# Patient Record
Sex: FEMALE | Race: WHITE | Employment: UNEMPLOYED | URBAN - METROPOLITAN AREA
[De-identification: names, ages, dates, MRNs, and addresses within clinical notes are randomized per-mention and may not be internally consistent; named-entity substitution may affect disease eponyms.]

---

## 2019-09-23 ENCOUNTER — TELEPHONE (OUTPATIENT)
Dept: ONCOLOGY | Age: 64
End: 2019-09-23

## 2019-09-23 NOTE — TELEPHONE ENCOUNTER
Attempted to reach patient to schedule new patient appointment with Dr. Adarsh Fofana per referral from Dr. Chyna Vega but no answer or voicemail.

## 2019-10-02 ENCOUNTER — OFFICE VISIT (OUTPATIENT)
Dept: ONCOLOGY | Age: 64
End: 2019-10-02

## 2019-10-02 DIAGNOSIS — N83.202 LEFT OVARIAN CYST: Primary | ICD-10-CM

## 2019-10-02 DIAGNOSIS — R93.89 THICKENED ENDOMETRIUM: ICD-10-CM

## 2019-10-02 NOTE — PROGRESS NOTES
[address], [address], [address] [facility]  [address] [facility], [address] [place] [address]  [place], [address]   [phone]  Osvaldo Manriquez DO      Patient ID:  Name:  Bryce Don  MRN:  [id number]  :  1955/63 y.o. Date:  10/2/2019      HISTORY OF PRESENT ILLNESS:  Bryce Don is a 59 y.o.   postmenopausal female referred by Dr. Lin Ding for complex cyst and endometrial polyp. Previous history is positive for myomectomy x 3 and right oophorectomy. Here today for furthur evaluation. Pt reports presenting to PCP regarding sharp pain in left side of pelvis. Had an exam concerning for mass. Was sent to gyn and had an ultrasound. Has occasional left sided abdominal pain. Denies bleeding. Admits to constipation. ,     Labs:  2019 : 6.8      Imaging  TVUS 19   FINDINGS:    UTERUS:     > Size: 4.2 x 1.7 x 3.7 cm.    > Masses: 1 cm diameter hypoechoic intramural mass right side uterus, additional 7 mm diameter hypoechoic intramural mass. > Endometrium: Small fluid within the endometrial cavity with polypoid-appearing echogenic lesion within the endometrial cavity measuring maximally 8 mm in diameter. Maximal thickness endometrial stripe 4 mm. RIGHT ADNEXA:     > Reported right-sided oophorectomy with no abnormal mass right adnexa.    >    LEFT ADNEXA:    > 2.3 x 0.9 x 1.7 cm. Color and spectral Doppler demonstrate normal flow to the left ovary with no evidence of ovarian torsion. Arterial and venous waveforms are normal.    > There is a complex 1.2 cm diameter lesion with rim of hypoechogenicity and central hyperechogenicity. OTHER: None. IMPRESSION  1. Several small intramural uterine fibroids the largest 1 cm. 2. Polypoid echogenicity endometrial cavity, possible endometrial polyp, differential diagnosis including hyperplasia or carcinoma.  Nonspecific small endometrial fluid.    3. 1.2 cm complex left adnexal lesion nonspecific but likely benign. ROS:   As above      There are no active problems to display for this patient. Past Medical History:   Diagnosis Date    Anemia     Back pain     Bronchitis     Esophageal reflux     Fibromyalgia     Headache(784.0)     Renal disorder     Ulcer, gastric, acute     UTI (lower urinary tract infection)       Past Surgical History:   Procedure Laterality Date    HX BACK SURGERY      HX BREAST BIOPSY      HX OTHER SURGICAL      Spleen Removal, Total    HX TUBAL LIGATION        OB History    None       Social History     Tobacco Use    Smoking status: Current Every Day Smoker    Smokeless tobacco: Never Used   Substance Use Topics    Alcohol use: Yes      Family History   Problem Relation Age of Onset    Cancer Mother       Current Outpatient Medications   Medication Sig    dextroamphetamine sulfate (DEXTROAMPHETAMINE, BULK,) Take 20 mg by mouth two (2) times a day.  HYDROcodone-acetaminophen (VICODIN HP)  mg per tablet Take  by mouth.  valACYclovir (VALTREX) 500 mg tablet Take  by mouth two (2) times a day.  morphine (AVINZA) 120 mg SR capsule Take  by mouth daily.  temazepam (RESTORIL) 15 mg capsule Take  by mouth nightly as needed for Sleep.  escitalopram (LEXAPRO) 20 mg tablet Take 20 mg by mouth daily.  FIBER, HERBAL, PO Take  by mouth.  omeprazole (PRILOSEC) 20 mg capsule Take 20 mg by mouth daily. No current facility-administered medications for this visit. Allergies   Allergen Reactions    Ibuprofen Unknown (comments)    Sulfa (Sulfonamide Antibiotics) Hives          OBJECTIVE:    Physical Exam  VITAL SIGNS: There were no vitals taken for this visit. GENERAL GILSON: in no apparent distress and well developed and well nourished   MUSCULOSKEL: no joint tenderness, deformity or swelling   INTEGUMENT:  warm and dry, no rashes or lesions   ABDOMEN . soft, NT, ND, No masses appreciated. Prior pfanensteil and midline   EXTREMITIES: extremities normal, atraumatic, no cyanosis or edema   PELVIC: Vulva and vagina appear normal. Bimanual exam reveals normal uterus and adnexa. RECTAL: deferred   GAEL SURVEY: Cervical, supraclavicular, axillary and inguinal nodes normal.   NEURO: Grossly normal         IMPRESSION/PLAN:  1. Complex left ovarian cyst, thickened endometrium (?polyp)    -reviewed her imaging and blood work   -discussed option of hysteroscopy/D&C and if benign serial ultrasound of ovary vs. Definitive treatment with hysterectomy, LSO with intraop path and staging if malignant.  Discussed higher risk of surgery given prior surgeries   -pt to discuss and let us know   -all of ms. gonzalez's questions/concerns addressed   -    The total time spent was 60 minutes regarding this patients diagnosis of left ovarian cyst, endometrial thickening and >50% of this time was spent counseling and coordinating care    [address] [place] [address]  Gynecologic Oncology  10/2/25292:36 AM

## 2019-10-02 NOTE — PATIENT INSTRUCTIONS
Pelvic Laparoscopy: Before Your Surgery What is pelvic laparoscopy? Pelvic laparoscopy (say \"tny-ea-ZZIY-kwame-pee\") is a type of surgery. It can help a doctor diagnose or treat a problem with your pelvic organs. These include the uterus, intestines, or bladder. This kind of surgery uses very small cuts. These cuts are called incisions. To do this surgery, a doctor puts a lighted tube through incisions in your belly. This tube is called a scope. It lets your doctor see your organs. Then the doctor inflates your belly with gas. The gas makes it easier and safer to see your organs. After the doctor puts special tools through the scope, he or she can see or remove what is needed. Next, the doctor releases most of the gas from your belly and closes your incisions with stitches. These incisions leave scars that fade with time. You will probably be asleep during the surgery. But if you are awake, you may feel some stretching and discomfort in your belly. Either way, you will not feel any pain. After the surgery, you will stay in the hospital for about 1 to 4 hours. You may be able to go back to work the next day. But some people need to rest for a few days to a few weeks before they can go back to work. It depends on the type of surgery you had, the type of work you do, and how you feel. Some people need more surgeries or treatments after this surgery. It depends on what the doctor finds. Follow-up care is a key part of your treatment and safety. Be sure to make and go to all appointments, and call your doctor if you are having problems. It's also a good idea to know your test results and keep a list of the medicines you take. What happens before surgery? 
 Surgery can be stressful. This information will help you understand what you can expect.  And it will help you safely prepare for your surgery. 
 Preparing for surgery 
  · Understand exactly what surgery is planned, along with the risks, benefits, and other options. · Tell your doctors ALL the medicines, vitamins, supplements, and herbal remedies you take. Some of these can increase the risk of bleeding or interact with anesthesia.  
  · If you take blood thinners, such as warfarin (Coumadin), clopidogrel (Plavix), or aspirin, be sure to talk to your doctor. He or she will tell you if you should stop taking these medicines before your surgery. Make sure that you understand exactly what your doctor wants you to do.  
  · Your doctor will tell you which medicines to take or stop before your surgery. You may need to stop taking certain medicines a week or more before surgery. So talk to your doctor as soon as you can.  
  · If you have an advance directive, let your doctor know. It may include a living will and a durable power of  for health care. Bring a copy to the hospital. If you don't have one, you may want to prepare one. It lets your doctor and loved ones know your health care wishes. Doctors advise that everyone prepare these papers before any type of surgery or procedure. What happens on the day of surgery? · Follow the instructions exactly about when to stop eating and drinking. If you don't, your surgery may be canceled. If your doctor told you to take your medicines on the day of surgery, take them with only a sip of water.  
  · Take a bath or shower before you come in for your surgery. Do not apply lotions, perfumes, deodorants, or nail polish.  
  · Do not shave the surgical site yourself.  
  · Take off all jewelry and piercings. And take out contact lenses, if you wear them.  
 At the hospital or surgery center · Bring a picture ID.  
  · The area for surgery is often marked to make sure there are no errors.  
  · You will be kept comfortable and safe by your anesthesia provider. The anesthesia may make you sleep. Or it may just numb the area being worked on.  
  · The surgery will take about 1 hour. Going home · Be sure you have someone to drive you home. Anesthesia and pain medicine make it unsafe for you to drive.  
  · You will be given more specific instructions about recovering from your surgery. They will cover things like diet, wound care, follow-up care, driving, and getting back to your normal routine. When should you call your doctor? · You have questions or concerns.  
  · You don't understand how to prepare for your surgery.  
  · You become ill before the surgery (such as fever, flu, or a cold).  
  · You need to reschedule or have changed your mind about having the surgery. Where can you learn more? Go to http://lalita-james.info/. Enter [phone] [phone] in the search box to learn more about \"Pelvic Laparoscopy: Before Your Surgery. \" Current as of: February 19, 2019 Content Version: 12.2 © 1075-4963 [employer]. Care instructions adapted under license by [employer] (which disclaims liability or warranty for this information). If you have questions about a medical condition or this instruction, always ask your healthcare professional. [address] any warranty or liability for your use of this information.

## 2019-10-02 NOTE — Clinical Note
10/2/19 Patient: Juliana Zambrano YOB: 1955 Date of Visit: 10/2/2019 Dian Montana MD 
VIA Dear Dian Montana MD, Thank you for referring Ms. Monica Null to [facility] for evaluation. My notes for this consultation are attached. If you have questions, please do not hesitate to call me. I look forward to following your patient along with you. Sincerely, Meme Roldan MD

## 2019-12-27 ENCOUNTER — TELEPHONE (OUTPATIENT)
Dept: ONCOLOGY | Age: 64
End: 2019-12-27

## 2019-12-27 DIAGNOSIS — N83.202 LEFT OVARIAN CYST: Primary | ICD-10-CM

## 2019-12-27 DIAGNOSIS — R93.89 THICKENED ENDOMETRIUM: ICD-10-CM

## 2019-12-27 NOTE — TELEPHONE ENCOUNTER
Pt reports she would like to have repeat US completed to evaluate her complex left ovarian cyst. Pt also request scan be performed at [facility]. Placed order and will fax order to [facility] central schedulingt. Advised pt to contact our office to schedule results review once procedure is scheduled. Patient verbalized understanding and agreed to plan. Patient instructed to contact office for any question or concerns.      Evita Lucas NP

## 2019-12-27 NOTE — TELEPHONE ENCOUNTER
Patient contacted the office stating she would like to get scheduled for an ultrasound per Radha GONZALEZ. Patient stated she would like to go to the [facility] location to have imaging done.

## 2020-01-08 ENCOUNTER — OFFICE VISIT (OUTPATIENT)
Dept: ONCOLOGY | Age: 65
End: 2020-01-08

## 2020-01-08 VITALS
BODY MASS INDEX: 26.2 KG/M2 | HEIGHT: 66 IN | RESPIRATION RATE: 12 BRPM | OXYGEN SATURATION: 98 % | TEMPERATURE: 99.3 F | DIASTOLIC BLOOD PRESSURE: 73 MMHG | WEIGHT: 163 LBS | HEART RATE: 94 BPM | SYSTOLIC BLOOD PRESSURE: 125 MMHG

## 2020-01-08 DIAGNOSIS — R93.89 THICKENED ENDOMETRIUM: Primary | ICD-10-CM

## 2020-01-08 NOTE — PROGRESS NOTES
[address], [address], [address]  [address] [facility], [facility]  [place], [address]   [phone]  Sal Mcintosh DO      Patient ID:  Name:  Isai Douglas  MRN:  [id number]  :  1955/64 y.o. Date:  2020      HISTORY OF PRESENT ILLNESS:  Isai Douglas is a 62 y.o.   postmenopausal female referred by Dr. Flaquita Villanueva for complex cyst and endometrial polyp. Previous history is positive for myomectomy x 3 and right oophorectomy. Here to review f/u ultrasound. Having some urinary incontinence. Also c/o some abdominal cramping. Labs:  2019 : 6.8      Imaging  TVUS 2020  FINDINGS:    UTERUS:     > Size: 4 x 2 x 3    > Masses: Hypoechoic masses are present suggestive of fibroids. Largest measures 9 x 7 x 7 mm.     > Endometrium: 7 x 3 x 6 mm hyperechoic mass within the endometrium. RIGHT ADNEXA:     > Right ovary is surgically absent.    > No adnexal mass. LEFT ADNEXA:    > Ovary appears normal. No solid mass. Color and spectral Doppler demonstrate normal flow to the left ovary with no evidence of ovarian torsion. Arterial and venous waveforms are normal.    > No adnexal mass. OTHER: None.    _______________    IMPRESSION    7 mm hyperechoic mass within the endometrium, likely an endometrial polyp. Recommend correlation with hysteroscopy. Right ovary is not visualized due to position within the pelvis. Small uterine fibroid  TVUS 19   FINDINGS:    UTERUS:     > Size: 4.2 x 1.7 x 3.7 cm.    > Masses: 1 cm diameter hypoechoic intramural mass right side uterus, additional 7 mm diameter hypoechoic intramural mass. > Endometrium: Small fluid within the endometrial cavity with polypoid-appearing echogenic lesion within the endometrial cavity measuring maximally 8 mm in diameter. Maximal thickness endometrial stripe 4 mm.     RIGHT ADNEXA:     > Reported right-sided oophorectomy with no abnormal mass right adnexa.    >    LEFT ADNEXA:    > 2.3 x 0.9 x 1.7 cm. Color and spectral Doppler demonstrate normal flow to the left ovary with no evidence of ovarian torsion. Arterial and venous waveforms are normal.    > There is a complex 1.2 cm diameter lesion with rim of hypoechogenicity and central hyperechogenicity. OTHER: None. IMPRESSION  1. Several small intramural uterine fibroids the largest 1 cm. 2. Polypoid echogenicity endometrial cavity, possible endometrial polyp, differential diagnosis including hyperplasia or carcinoma. Nonspecific small endometrial fluid. 3. 1.2 cm complex left adnexal lesion nonspecific but likely benign. ROS:   As above      There are no active problems to display for this patient. Past Medical History:   Diagnosis Date    Anemia     Back pain     Bronchitis     Esophageal reflux     Fibromyalgia     Headache(784.0)     Renal disorder     Ulcer, gastric, acute     UTI (lower urinary tract infection)       Past Surgical History:   Procedure Laterality Date    HX BACK SURGERY      HX BREAST BIOPSY      HX OTHER SURGICAL      Spleen Removal, Total    HX TUBAL LIGATION        OB History    No obstetric history on file. Social History     Tobacco Use    Smoking status: Current Every Day Smoker    Smokeless tobacco: Never Used   Substance Use Topics    Alcohol use: Yes      Family History   Problem Relation Age of Onset    Cancer Mother       Current Outpatient Medications   Medication Sig    dextroamphetamine sulfate (DEXTROAMPHETAMINE, BULK,) Take 20 mg by mouth two (2) times a day.  HYDROcodone-acetaminophen (VICODIN HP)  mg per tablet Take  by mouth.  valACYclovir (VALTREX) 500 mg tablet Take  by mouth two (2) times a day.  morphine (AVINZA) 120 mg SR capsule Take  by mouth daily.  temazepam (RESTORIL) 15 mg capsule Take  by mouth nightly as needed for Sleep.     escitalopram (LEXAPRO) 20 mg tablet Take 20 mg by mouth daily.  FIBER, HERBAL, PO Take  by mouth.  omeprazole (PRILOSEC) 20 mg capsule Take 20 mg by mouth daily. No current facility-administered medications for this visit. Allergies   Allergen Reactions    Ibuprofen Unknown (comments)    Sulfa (Sulfonamide Antibiotics) Hives          OBJECTIVE:    Physical Exam  VITAL SIGNS: Visit Vitals  /73   Pulse 94   Temp 99.3 °F (37.4 °C) (Oral)   Resp 12   Ht 5' 6\" (1.676 m)   Wt 73.9 kg (163 lb)   LMP  (Within Years) Comment: Has not had a menstrual    SpO2 98%   BMI 26.31 kg/m²      GENERAL GILSON: in no apparent distress and well developed and well nourished   deferred      IMPRESSION/PLAN:  1. Complex left ovarian cyst (resolved), thickened endometrium (?polyp)    -reviewed her imaging   -ovarian cyst resolve.  Still persistent thickening   -discussed hysteroscopy/D&C    -surgery to be scheduled at [facility]    The total time spent was 25 minutes regarding this patients diagnosis of  endometrial thickening and >50% of this time was spent counseling and coordinating care    82 [facility] Oncology  1/8/20209:36 AM

## 2020-01-08 NOTE — Clinical Note
1/8/20 Patient: Seven Foy YOB: 1955 Date of Visit: 1/8/2020 Hakeem Saldaña MD 
VIA Dear Hakeem Saldaña MD, Thank you for referring Ms. Monica Null to [facility] for evaluation. My notes for this consultation are attached. If you have questions, please do not hesitate to call me. I look forward to following your patient along with you. Sincerely, Lindsay Vanegas MD

## 2020-01-08 NOTE — PROGRESS NOTES
Visit Vitals  /73   Pulse 94   Temp 99.3 °F (37.4 °C) (Oral)   Resp 12   Ht 5' 6\" (1.676 m)   Wt 163 lb (73.9 kg)   SpO2 98%   BMI 26.31 kg/m²     Patient currently attending office visit 3 month. ..ovarian cyst. Follow up of ultrasound results. 1. Have you been to the ER, urgent care clinic since your last visit? No     Hospitalized since your last visit? No      2. Any changes in bowel movement? No     3. Any abdominal pain or discomfort? Yes, pain is sharp. Level of pain 3 or 4 out of 10     4. Any pain or discomfort in urination? No, patient concerned may have bladder infection. 5. Any abnormal bleeding or discharge? No     .No flowsheet data found.

## 2020-01-08 NOTE — H&P (VIEW-ONLY)
[address], [address] [facility] [place], [address] [facility] 
[address] [facility], [address] [place], [address] [facility] 
[phone] Mike Patterson DO Patient ID: 
Name:  Kate Bird MRN:  [id number] :  1955/64 y.o. Date:  2020 HISTORY OF PRESENT ILLNESS: 
Kate Bird is a 62 y.o.   postmenopausal female referred by Dr. John Da Silva for complex cyst and endometrial polyp. Previous history is positive for myomectomy x 3 and right oophorectomy. Here to review f/u ultrasound. Having some urinary incontinence. Also c/o some abdominal cramping. Labs: 
2019 : 6.8 Imaging TVUS 2020 FINDINGS: 
 
UTERUS:  
  > Size: 4 x 2 x 3 
  > Masses: Hypoechoic masses are present suggestive of fibroids. Largest measures 9 x 7 x 7 mm.  
  > Endometrium: 7 x 3 x 6 mm hyperechoic mass within the endometrium. RIGHT ADNEXA:  
  > Right ovary is surgically absent. 
  > No adnexal mass. LEFT ADNEXA: 
  > Ovary appears normal. No solid mass. Color and spectral Doppler demonstrate normal flow to the left ovary with no evidence of ovarian torsion. Arterial and venous waveforms are normal. 
  > No adnexal mass. OTHER: None. 
 
_______________ IMPRESSION 
 
7 mm hyperechoic mass within the endometrium, likely an endometrial polyp. Recommend correlation with hysteroscopy. Right ovary is not visualized due to position within the pelvis. Small uterine fibroid TVUS 19 FINDINGS: 
 
UTERUS:  
  > Size: 4.2 x 1.7 x 3.7 cm. 
  > Masses: 1 cm diameter hypoechoic intramural mass right side uterus, additional 7 mm diameter hypoechoic intramural mass. > Endometrium: Small fluid within the endometrial cavity with polypoid-appearing echogenic lesion within the endometrial cavity measuring maximally 8 mm in diameter. Maximal thickness endometrial stripe 4 mm.  
 
RIGHT ADNEXA:  
 > Reported right-sided oophorectomy with no abnormal mass right adnexa. 
  > 
 
LEFT ADNEXA: 
  > 2.3 x 0.9 x 1.7 cm. Color and spectral Doppler demonstrate normal flow to the left ovary with no evidence of ovarian torsion. Arterial and venous waveforms are normal. 
  > There is a complex 1.2 cm diameter lesion with rim of hypoechogenicity and central hyperechogenicity. OTHER: None. IMPRESSION 1. Several small intramural uterine fibroids the largest 1 cm. 2. Polypoid echogenicity endometrial cavity, possible endometrial polyp, differential diagnosis including hyperplasia or carcinoma. Nonspecific small endometrial fluid. 3. 1.2 cm complex left adnexal lesion nonspecific but likely benign. ROS:  
As above There are no active problems to display for this patient. Past Medical History:  
Diagnosis Date  Anemia  Back pain  Bronchitis  Esophageal reflux  Fibromyalgia  Headache(784.0)  Renal disorder  Ulcer, gastric, acute  UTI (lower urinary tract infection) Past Surgical History:  
Procedure Laterality Date  HX BACK SURGERY    
 HX BREAST BIOPSY  HX OTHER SURGICAL Spleen Removal, Total  
 HX TUBAL LIGATION    
  
OB History No obstetric history on file. Social History Tobacco Use  Smoking status: Current Every Day Smoker  Smokeless tobacco: Never Used Substance Use Topics  Alcohol use: Yes Family History Problem Relation Age of Onset  Cancer Mother Current Outpatient Medications Medication Sig  
 dextroamphetamine sulfate (DEXTROAMPHETAMINE, BULK,) Take 20 mg by mouth two (2) times a day.  HYDROcodone-acetaminophen (VICODIN HP)  mg per tablet Take  by mouth.  valACYclovir (VALTREX) 500 mg tablet Take  by mouth two (2) times a day.  morphine (AVINZA) 120 mg SR capsule Take  by mouth daily.  temazepam (RESTORIL) 15 mg capsule Take  by mouth nightly as needed for Sleep.  escitalopram (LEXAPRO) 20 mg tablet Take 20 mg by mouth daily.  FIBER, HERBAL, PO Take  by mouth.  omeprazole (PRILOSEC) 20 mg capsule Take 20 mg by mouth daily. No current facility-administered medications for this visit. Allergies Allergen Reactions  Ibuprofen Unknown (comments)  Sulfa (Sulfonamide Antibiotics) Hives OBJECTIVE: 
 
Physical Exam 
VITAL SIGNS: Visit Vitals /73 Pulse 94 Temp 99.3 °F (37.4 °C) (Oral) Resp 12 Ht 5' 6\" (1.676 m) Wt 73.9 kg (163 lb) LMP  (Within Years) Comment: Has not had a menstrual   
SpO2 98% BMI 26.31 kg/m² GENERAL GILSON: in no apparent distress and well developed and well nourished  
deferred IMPRESSION/PLAN: 
1. Complex left ovarian cyst (resolved), thickened endometrium (?polyp) -reviewed her imaging 
 -ovarian cyst resolve. Still persistent thickening 
 -discussed hysteroscopy/D&C  
 -surgery to be scheduled at [facility] The total time spent was 25 minutes regarding this patients diagnosis of  endometrial thickening and >50% of this time was spent counseling and coordinating care Ady Kurtz DO Gynecologic Oncology 1/8/20209:36 AM

## 2020-01-08 NOTE — PATIENT INSTRUCTIONS
Hysteroscopy: Before Your Procedure  What is a hysteroscopy? A hysteroscopy is a procedure to find and treat problems with your uterus. It may be done to remove growths from the uterus. Or it may be done to diagnose or treat fertility problems. It can also be done to diagnose or treat abnormal bleeding. The doctor will guide a lighted tube through the cervix and into the uterus. This tube is called a hysteroscope, or scope. It lets your doctor see inside your body. The doctor will fill your uterus with air or liquid. This makes it easier to see the inside of your uterus with the scope. The doctor may also put tools through the scope to treat a problem. During this procedure, the doctor may take out a small piece of tissue for study. This is called a biopsy. Or the doctor may gently scrape tissue from the inner wall of the uterus. This is called a dilation and curettage, or D&C. If your doctor filled your uterus with liquid, most it will flow out when the scope is removed. You will most likely go home the same day. Many women are able to go back to work the next day. But it depends on what was done and the type of work you do. Follow-up care is a key part of your treatment and safety. Be sure to make and go to all appointments, and call your doctor if you are having problems. It's also a good idea to know your test results and keep a list of the medicines you take. What happens before the procedure?   Preparing for the procedure    · Understand exactly what procedure is planned, along with the risks, benefits, and other options. · Tell your doctors ALL the medicines, vitamins, supplements, and herbal remedies you take. Some of these can increase the risk of bleeding or interact with anesthesia.     · If you take aspirin or some other blood thinner, be sure to talk to your doctor. He or she will tell you if you should stop taking it before your procedure.  Make sure that you understand exactly what your doctor wants you to do.     · Your doctor will tell you which medicines to take or stop before your procedure. You may need to stop taking certain medicines a week or more before the procedure. So talk to your doctor as soon as you can.     · If you have an advance directive, let your doctor know. It may include a living will and a durable power of  for health care. Bring a copy to the hospital. If you don't have one, you may want to prepare one. It lets your doctor and loved ones know your health care wishes. Doctors advise that everyone prepare these papers before any type of surgery or procedure. Procedures can be stressful. This information will help you understand what you can expect. And it will help you safely prepare for your procedure. What happens on the day of the procedure? · Follow the instructions exactly about when to stop eating and drinking. If you don't, your procedure may be canceled. If your doctor has instructed you to take your medicines on the day of the procedure, take them with only a sip of water.     · Take a bath or shower before you come in for your procedure. Do not apply lotions, perfumes, deodorants, or nail polish.     · Take off all jewelry and piercings. And take out contact lenses, if you wear them.    At the hospital or surgery center   · Bring a picture ID.     · You will be kept comfortable and safe by your anesthesia provider. The anesthesia may make you sleep. Or it may just numb the area being worked on.     · The procedure usually takes less than 30 minutes. Going home   · Be sure you have someone to drive you home. Anesthesia and pain medicine make it unsafe for you to drive.     · You will be given more specific instructions about recovering from your procedure. They will cover things like diet, wound care, follow-up care, driving, and getting back to your normal routine. When should you call your doctor?    · You have questions or concerns.     · You don't understand how to prepare for your procedure.     · You become ill before the procedure (such as fever, flu, or a cold).     · You need to reschedule or have changed your mind about having the procedure. Where can you learn more? Go to http://lalita-james.info/. Enter V403 in the search box to learn more about \"Hysteroscopy: Before Your Procedure. \"  Current as of: February 19, 2019  Content Version: 12.2  © 7568-5904 [employer]. Care instructions adapted under license by [employer] (which disclaims liability or warranty for this information). If you have questions about a medical condition or this instruction, always ask your healthcare professional. [address] any warranty or liability for your use of this information.

## 2020-01-13 ENCOUNTER — OFFICE VISIT (OUTPATIENT)
Dept: ONCOLOGY | Age: 65
End: 2020-01-13

## 2020-01-13 ENCOUNTER — HOSPITAL ENCOUNTER (OUTPATIENT)
Dept: LAB | Age: 65
Discharge: HOME | End: 2020-01-13
Payer: COMMERCIAL

## 2020-01-13 VITALS
HEIGHT: 66 IN | HEART RATE: 93 BPM | WEIGHT: 167 LBS | SYSTOLIC BLOOD PRESSURE: 119 MMHG | RESPIRATION RATE: 12 BRPM | OXYGEN SATURATION: 97 % | DIASTOLIC BLOOD PRESSURE: 76 MMHG | BODY MASS INDEX: 26.84 KG/M2 | TEMPERATURE: 98.2 F

## 2020-01-13 DIAGNOSIS — Z01.818 PREOPERATIVE TESTING: Primary | ICD-10-CM

## 2020-01-13 DIAGNOSIS — Z01.818 PREOPERATIVE TESTING: ICD-10-CM

## 2020-01-13 LAB
ABO + RH BLD: NORMAL
ALBUMIN SERPL-MCNC: 3.6 G/DL (ref 3.4–5)
ALBUMIN/GLOB SERPL: 1.1 {RATIO} (ref 0.8–1.7)
ALP SERPL-CCNC: 88 U/L (ref 45–117)
ALT SERPL-CCNC: 17 U/L (ref 13–56)
ANION GAP SERPL CALC-SCNC: 4 MMOL/L (ref 3–18)
AST SERPL-CCNC: 9 U/L (ref 10–38)
BASOPHILS # BLD: 0.1 K/UL (ref 0–0.1)
BASOPHILS NFR BLD: 1 % (ref 0–2)
BILIRUB SERPL-MCNC: 0.4 MG/DL (ref 0.2–1)
BLOOD GROUP ANTIBODIES SERPL: NORMAL
BUN SERPL-MCNC: 13 MG/DL (ref 7–18)
BUN/CREAT SERPL: 18 (ref 12–20)
CALCIUM SERPL-MCNC: 8.9 MG/DL (ref 8.5–10.1)
CHLORIDE SERPL-SCNC: 105 MMOL/L (ref 100–111)
CO2 SERPL-SCNC: 30 MMOL/L (ref 21–32)
CREAT SERPL-MCNC: 0.74 MG/DL (ref 0.6–1.3)
DIFFERENTIAL METHOD BLD: ABNORMAL
EOSINOPHIL # BLD: 0.4 K/UL (ref 0–0.4)
EOSINOPHIL NFR BLD: 3 % (ref 0–5)
ERYTHROCYTE [DISTWIDTH] IN BLOOD BY AUTOMATED COUNT: 15.2 % (ref 11.6–14.5)
GLOBULIN SER CALC-MCNC: 3.4 G/DL (ref 2–4)
GLUCOSE SERPL-MCNC: 90 MG/DL (ref 74–99)
HCT VFR BLD AUTO: 39.4 % (ref 35–45)
HGB BLD-MCNC: 12.8 G/DL (ref 12–16)
LYMPHOCYTES # BLD: 5 K/UL (ref 0.9–3.6)
LYMPHOCYTES NFR BLD: 44 % (ref 21–52)
MCH RBC QN AUTO: 29.2 PG (ref 24–34)
MCHC RBC AUTO-ENTMCNC: 32.5 G/DL (ref 31–37)
MCV RBC AUTO: 90 FL (ref 74–97)
MONOCYTES # BLD: 1.3 K/UL (ref 0.05–1.2)
MONOCYTES NFR BLD: 12 % (ref 3–10)
NEUTS SEG # BLD: 4.5 K/UL (ref 1.8–8)
NEUTS SEG NFR BLD: 40 % (ref 40–73)
PLATELET # BLD AUTO: 402 K/UL (ref 135–420)
PMV BLD AUTO: 11.3 FL (ref 9.2–11.8)
POTASSIUM SERPL-SCNC: 4.7 MMOL/L (ref 3.5–5.5)
PROT SERPL-MCNC: 7 G/DL (ref 6.4–8.2)
RBC # BLD AUTO: 4.38 M/UL (ref 4.2–5.3)
SODIUM SERPL-SCNC: 139 MMOL/L (ref 136–145)
SPECIMEN EXP DATE BLD: NORMAL
WBC # BLD AUTO: 11.3 K/UL (ref 4.6–13.2)

## 2020-01-13 PROCEDURE — 93005 ELECTROCARDIOGRAM TRACING: CPT

## 2020-01-13 PROCEDURE — 85025 COMPLETE CBC W/AUTO DIFF WBC: CPT

## 2020-01-13 PROCEDURE — 36415 COLL VENOUS BLD VENIPUNCTURE: CPT

## 2020-01-13 PROCEDURE — 86900 BLOOD TYPING SEROLOGIC ABO: CPT

## 2020-01-13 PROCEDURE — 80053 COMPREHEN METABOLIC PANEL: CPT

## 2020-01-13 RX ORDER — PRAVASTATIN SODIUM 40 MG/1
TABLET ORAL
COMMUNITY
Start: 2019-10-27

## 2020-01-13 RX ORDER — TIZANIDINE 4 MG/1
TABLET ORAL
COMMUNITY
Start: 2020-01-08

## 2020-01-13 RX ORDER — PAROXETINE HYDROCHLORIDE 40 MG/1
TABLET, FILM COATED ORAL
COMMUNITY

## 2020-01-13 NOTE — PROGRESS NOTES
Visit Vitals  /76 (BP 1 Location: Left arm, BP Patient Position: Sitting)   Pulse 93   Temp 98.2 °F (36.8 °C) (Oral)   Resp 12   Ht 5' 6\" (1.676 m)   Wt 167 lb (75.8 kg)   SpO2 97%   BMI 26.95 kg/m²      Patient currently attending office visit for pre-surgical consult with DANE Rutledge for [facility]  procedure. 1. Have you been to the ER, urgent care clinic since your last visit? No     Hospitalized since your last visit? No      2. Any changes in bowel movement? No     3. Any abdominal pain or discomfort? No     4. Any pain or discomfort in urination? No     5. Any abnormal bleeding or discharge? No     .No flowsheet data found.

## 2020-01-13 NOTE — PROGRESS NOTES
Francisca Moore, a 62 y.o. female,  is here for   Chief Complaint   Patient presents with    Advice Only     Pre-surgical consult        Visit Vitals  /76 (BP 1 Location: Left arm, BP Patient Position: Sitting)   Pulse 93   Temp 98.2 °F (36.8 °C) (Oral)   Resp 12   Ht 5' 6\" (1.676 m)   Wt 75.8 kg (167 lb)   SpO2 97%   BMI 26.95 kg/m²       1. Have you been to the ER, urgent care clinic since your last visit? Hospitalized since your last visit? No    2. Have you seen or consulted any other health care providers outside of the [address] [place] [address] since your last visit? Include any pap smears or colon screening. No      Reviewed consent form for Hysteroscopy, D/C and information packet for a  Hysteroscopy, D/C scheduled on 01/15/2020 at [facility] 2pm with an arrival time of 12pm. Patient was given information packet that included pre-op and post-op instructions as well as the scheduled date, start time, arrival time, and length of the surgery and signed the consent form. Patient expressed understanding and had no further questions. Patient was encouraged to call if they have questions later.     Edy Sesay NP

## 2020-01-14 ENCOUNTER — TELEPHONE (OUTPATIENT)
Dept: ONCOLOGY | Age: 65
End: 2020-01-14

## 2020-01-14 ENCOUNTER — ANESTHESIA EVENT (OUTPATIENT)
Dept: SURGERY | Age: 65
End: 2020-01-14
Payer: COMMERCIAL

## 2020-01-14 LAB
ATRIAL RATE: 84 BPM
CALCULATED P AXIS, ECG09: 65 DEGREES
CALCULATED R AXIS, ECG10: 70 DEGREES
CALCULATED T AXIS, ECG11: 58 DEGREES
DIAGNOSIS, 93000: NORMAL
P-R INTERVAL, ECG05: 154 MS
Q-T INTERVAL, ECG07: 372 MS
QRS DURATION, ECG06: 78 MS
QTC CALCULATION (BEZET), ECG08: 439 MS
VENTRICULAR RATE, ECG03: 84 BPM

## 2020-01-14 RX ORDER — CHOLECALCIFEROL (VITAMIN D3) 125 MCG
10 CAPSULE ORAL
COMMUNITY

## 2020-01-14 RX ORDER — MORPHINE SULFATE 30 MG/1
30 TABLET ORAL
COMMUNITY

## 2020-01-15 ENCOUNTER — ANESTHESIA (OUTPATIENT)
Dept: SURGERY | Age: 65
End: 2020-01-15
Payer: COMMERCIAL

## 2020-01-15 ENCOUNTER — HOSPITAL ENCOUNTER (OUTPATIENT)
Age: 65
Setting detail: OUTPATIENT SURGERY
Discharge: HOME | End: 2020-01-15
Attending: OBSTETRICS & GYNECOLOGY | Admitting: OBSTETRICS & GYNECOLOGY
Payer: COMMERCIAL

## 2020-01-15 VITALS
WEIGHT: 164 LBS | BODY MASS INDEX: 26.36 KG/M2 | DIASTOLIC BLOOD PRESSURE: 54 MMHG | OXYGEN SATURATION: 91 % | HEART RATE: 89 BPM | RESPIRATION RATE: 13 BRPM | SYSTOLIC BLOOD PRESSURE: 117 MMHG | TEMPERATURE: 98.6 F | HEIGHT: 66 IN

## 2020-01-15 DIAGNOSIS — G89.18 POST-OP PAIN: Primary | ICD-10-CM

## 2020-01-15 PROCEDURE — 77030040361 HC SLV COMPR DVT MDII -B: Performed by: OBSTETRICS & GYNECOLOGY

## 2020-01-15 PROCEDURE — 74011250636 HC RX REV CODE- 250/636: Performed by: OBSTETRICS & GYNECOLOGY

## 2020-01-15 PROCEDURE — 74011250636 HC RX REV CODE- 250/636: Performed by: NURSE ANESTHETIST, CERTIFIED REGISTERED

## 2020-01-15 PROCEDURE — 76210000021 HC REC RM PH II 0.5 TO 1 HR: Performed by: OBSTETRICS & GYNECOLOGY

## 2020-01-15 PROCEDURE — 74011000250 HC RX REV CODE- 250: Performed by: OBSTETRICS & GYNECOLOGY

## 2020-01-15 PROCEDURE — 77030010509 HC AIRWY LMA MSK TELE -A: Performed by: ANESTHESIOLOGY

## 2020-01-15 PROCEDURE — 88305 TISSUE EXAM BY PATHOLOGIST: CPT

## 2020-01-15 PROCEDURE — 76010000138 HC OR TIME 0.5 TO 1 HR: Performed by: OBSTETRICS & GYNECOLOGY

## 2020-01-15 PROCEDURE — 77030005537 HC CATH URETH BARD -A: Performed by: OBSTETRICS & GYNECOLOGY

## 2020-01-15 PROCEDURE — 76060000032 HC ANESTHESIA 0.5 TO 1 HR: Performed by: OBSTETRICS & GYNECOLOGY

## 2020-01-15 PROCEDURE — 74011250636 HC RX REV CODE- 250/636

## 2020-01-15 PROCEDURE — 74011250636 HC RX REV CODE- 250/636: Performed by: ANESTHESIOLOGY

## 2020-01-15 PROCEDURE — 76210000006 HC OR PH I REC 0.5 TO 1 HR: Performed by: OBSTETRICS & GYNECOLOGY

## 2020-01-15 PROCEDURE — 74011000250 HC RX REV CODE- 250: Performed by: NURSE ANESTHETIST, CERTIFIED REGISTERED

## 2020-01-15 RX ORDER — SODIUM CHLORIDE 0.9 % (FLUSH) 0.9 %
5-40 SYRINGE (ML) INJECTION EVERY 8 HOURS
Status: DISCONTINUED | OUTPATIENT
Start: 2020-01-15 | End: 2020-01-15 | Stop reason: HOSPADM

## 2020-01-15 RX ORDER — HYDROMORPHONE HYDROCHLORIDE 2 MG/ML
0.5 INJECTION, SOLUTION INTRAMUSCULAR; INTRAVENOUS; SUBCUTANEOUS
Status: DISCONTINUED | OUTPATIENT
Start: 2020-01-15 | End: 2020-01-17 | Stop reason: HOSPADM

## 2020-01-15 RX ORDER — PROPOFOL 10 MG/ML
INJECTION, EMULSION INTRAVENOUS AS NEEDED
Status: DISCONTINUED | OUTPATIENT
Start: 2020-01-15 | End: 2020-01-15 | Stop reason: HOSPADM

## 2020-01-15 RX ORDER — ONDANSETRON 2 MG/ML
4 INJECTION INTRAMUSCULAR; INTRAVENOUS ONCE
Status: COMPLETED | OUTPATIENT
Start: 2020-01-15 | End: 2020-01-15

## 2020-01-15 RX ORDER — ONDANSETRON 2 MG/ML
INJECTION INTRAMUSCULAR; INTRAVENOUS AS NEEDED
Status: DISCONTINUED | OUTPATIENT
Start: 2020-01-15 | End: 2020-01-15 | Stop reason: HOSPADM

## 2020-01-15 RX ORDER — FAMOTIDINE 20 MG/50ML
20 INJECTION, SOLUTION INTRAVENOUS
Status: DISCONTINUED | OUTPATIENT
Start: 2020-01-15 | End: 2020-01-15

## 2020-01-15 RX ORDER — ONDANSETRON 2 MG/ML
INJECTION INTRAMUSCULAR; INTRAVENOUS
Status: COMPLETED
Start: 2020-01-15 | End: 2020-01-15

## 2020-01-15 RX ORDER — FENTANYL CITRATE 50 UG/ML
100 INJECTION, SOLUTION INTRAMUSCULAR; INTRAVENOUS ONCE
Status: COMPLETED | OUTPATIENT
Start: 2020-01-15 | End: 2020-01-15

## 2020-01-15 RX ORDER — SODIUM CHLORIDE, SODIUM LACTATE, POTASSIUM CHLORIDE, CALCIUM CHLORIDE 600; 310; 30; 20 MG/100ML; MG/100ML; MG/100ML; MG/100ML
25 INJECTION, SOLUTION INTRAVENOUS CONTINUOUS
Status: DISCONTINUED | OUTPATIENT
Start: 2020-01-15 | End: 2020-01-15 | Stop reason: HOSPADM

## 2020-01-15 RX ORDER — DEXAMETHASONE SODIUM PHOSPHATE 4 MG/ML
INJECTION, SOLUTION INTRA-ARTICULAR; INTRALESIONAL; INTRAMUSCULAR; INTRAVENOUS; SOFT TISSUE AS NEEDED
Status: DISCONTINUED | OUTPATIENT
Start: 2020-01-15 | End: 2020-01-15 | Stop reason: HOSPADM

## 2020-01-15 RX ORDER — LIDOCAINE HYDROCHLORIDE 20 MG/ML
INJECTION, SOLUTION EPIDURAL; INFILTRATION; INTRACAUDAL; PERINEURAL AS NEEDED
Status: DISCONTINUED | OUTPATIENT
Start: 2020-01-15 | End: 2020-01-15 | Stop reason: HOSPADM

## 2020-01-15 RX ORDER — OXYCODONE AND ACETAMINOPHEN 5; 325 MG/1; MG/1
1-2 TABLET ORAL
Qty: 40 TAB | Refills: 0 | Status: SHIPPED | OUTPATIENT
Start: 2020-01-15 | End: 2020-01-29

## 2020-01-15 RX ORDER — LIDOCAINE HYDROCHLORIDE 10 MG/ML
0.1 INJECTION, SOLUTION EPIDURAL; INFILTRATION; INTRACAUDAL; PERINEURAL AS NEEDED
Status: DISCONTINUED | OUTPATIENT
Start: 2020-01-15 | End: 2020-01-15 | Stop reason: HOSPADM

## 2020-01-15 RX ORDER — SODIUM CHLORIDE 0.9 % (FLUSH) 0.9 %
5-40 SYRINGE (ML) INJECTION AS NEEDED
Status: DISCONTINUED | OUTPATIENT
Start: 2020-01-15 | End: 2020-01-15 | Stop reason: HOSPADM

## 2020-01-15 RX ORDER — HYDROMORPHONE HYDROCHLORIDE 2 MG/ML
INJECTION, SOLUTION INTRAMUSCULAR; INTRAVENOUS; SUBCUTANEOUS
Status: COMPLETED
Start: 2020-01-15 | End: 2020-01-15

## 2020-01-15 RX ORDER — MIDAZOLAM HYDROCHLORIDE 1 MG/ML
INJECTION, SOLUTION INTRAMUSCULAR; INTRAVENOUS AS NEEDED
Status: DISCONTINUED | OUTPATIENT
Start: 2020-01-15 | End: 2020-01-15 | Stop reason: HOSPADM

## 2020-01-15 RX ORDER — FENTANYL CITRATE 50 UG/ML
INJECTION, SOLUTION INTRAMUSCULAR; INTRAVENOUS AS NEEDED
Status: DISCONTINUED | OUTPATIENT
Start: 2020-01-15 | End: 2020-01-15 | Stop reason: HOSPADM

## 2020-01-15 RX ADMIN — FENTANYL CITRATE 100 MCG: 50 INJECTION, SOLUTION INTRAMUSCULAR; INTRAVENOUS at 13:22

## 2020-01-15 RX ADMIN — ONDANSETRON 4 MG: 2 INJECTION INTRAMUSCULAR; INTRAVENOUS at 16:00

## 2020-01-15 RX ADMIN — PROPOFOL 150 MG: 10 INJECTION, EMULSION INTRAVENOUS at 15:16

## 2020-01-15 RX ADMIN — DEXAMETHASONE SODIUM PHOSPHATE 4 MG: 4 INJECTION, SOLUTION INTRAMUSCULAR; INTRAVENOUS at 15:21

## 2020-01-15 RX ADMIN — SODIUM CHLORIDE, SODIUM LACTATE, POTASSIUM CHLORIDE, AND CALCIUM CHLORIDE 25 ML/HR: 600; 310; 30; 20 INJECTION, SOLUTION INTRAVENOUS at 12:58

## 2020-01-15 RX ADMIN — HYDROMORPHONE HYDROCHLORIDE 0.5 MG: 2 INJECTION, SOLUTION INTRAMUSCULAR; INTRAVENOUS; SUBCUTANEOUS at 15:50

## 2020-01-15 RX ADMIN — HYDROMORPHONE HYDROCHLORIDE 0.5 MG: 2 INJECTION INTRAMUSCULAR; INTRAVENOUS; SUBCUTANEOUS at 16:25

## 2020-01-15 RX ADMIN — LIDOCAINE HYDROCHLORIDE 50 MG: 20 INJECTION, SOLUTION EPIDURAL; INFILTRATION; INTRACAUDAL; PERINEURAL at 15:15

## 2020-01-15 RX ADMIN — HYDROMORPHONE HYDROCHLORIDE 0.5 MG: 2 INJECTION INTRAMUSCULAR; INTRAVENOUS; SUBCUTANEOUS at 15:50

## 2020-01-15 RX ADMIN — HYDROMORPHONE HYDROCHLORIDE 0.5 MG: 2 INJECTION INTRAMUSCULAR; INTRAVENOUS; SUBCUTANEOUS at 16:00

## 2020-01-15 RX ADMIN — ONDANSETRON 4 MG: 2 INJECTION INTRAMUSCULAR; INTRAVENOUS at 15:26

## 2020-01-15 RX ADMIN — MIDAZOLAM HYDROCHLORIDE 2 MG: 2 INJECTION, SOLUTION INTRAMUSCULAR; INTRAVENOUS at 15:11

## 2020-01-15 RX ADMIN — FENTANYL CITRATE 25 MCG: 50 INJECTION INTRAMUSCULAR; INTRAVENOUS at 15:22

## 2020-01-15 RX ADMIN — FENTANYL CITRATE 25 MCG: 50 INJECTION INTRAMUSCULAR; INTRAVENOUS at 15:20

## 2020-01-15 RX ADMIN — FENTANYL CITRATE 50 MCG: 50 INJECTION INTRAMUSCULAR; INTRAVENOUS at 15:15

## 2020-01-15 RX ADMIN — FAMOTIDINE 20 MG: 10 INJECTION, SOLUTION INTRAVENOUS at 13:25

## 2020-01-15 NOTE — ANESTHESIA PREPROCEDURE EVALUATION
Relevant Problems   No relevant active problems       Anesthetic History   No history of anesthetic complications            Review of Systems / Medical History  Patient summary reviewed, nursing notes reviewed and pertinent labs reviewed    Pulmonary  Within defined limits                 Neuro/Psych   Within defined limits           Cardiovascular                       GI/Hepatic/Renal           PUD     Endo/Other             Other Findings   Comments: Severe back pain and nerve injury. Chr. Pain   Pt. on Morphine!            Physical Exam    Airway  Mallampati: II  TM Distance: 4 - 6 cm  Neck ROM: normal range of motion   Mouth opening: Normal     Cardiovascular  Regular rate and rhythm,  S1 and S2 normal,  no murmur, click, rub, or gallop  Rhythm: regular  Rate: normal         Dental  No notable dental hx       Pulmonary  Breath sounds clear to auscultation               Abdominal  GI exam deferred       Other Findings            Anesthetic Plan    ASA: 2  Anesthesia type: general          Induction: Intravenous  Anesthetic plan and risks discussed with: Patient

## 2020-01-15 NOTE — DISCHARGE INSTRUCTIONS
Patient Education     Patient will not fill percocet script because she is already on two narcotics for her chronic pain. .. one standard and one prn. DISCHARGE SUMMARY from Nurse    PATIENT INSTRUCTIONS:    After general anesthesia or intravenous sedation, for 24 hours or while taking prescription Narcotics:  · Limit your activities  · Do not drive and operate hazardous machinery  · Do not make important personal or business decisions  · Do  not drink alcoholic beverages  · If you have not urinated within 8 hours after discharge, please contact your surgeon on call. Report the following to your surgeon:  · Excessive pain, swelling, redness or odor of or around the surgical area  · Temperature over 100.5  · Nausea and vomiting lasting longer than 4 hours or if unable to take medications  · Any signs of decreased circulation or nerve impairment to extremity: change in color, persistent  numbness, tingling, coldness or increase pain  · Any questions    What to do at Home:  Recommended activity: {discharge activity:72232}, ***    If you experience any of the following symptoms ***, please follow up with ***. *  Please give a list of your current medications to your Primary Care Provider. *  Please update this list whenever your medications are discontinued, doses are      changed, or new medications (including over-the-counter products) are added. *  Please carry medication information at all times in case of emergency situations. These are general instructions for a healthy lifestyle:    No smoking/ No tobacco products/ Avoid exposure to second hand smoke  Surgeon General's Warning:  Quitting smoking now greatly reduces serious risk to your health.     Obesity, smoking, and sedentary lifestyle greatly increases your risk for illness    A healthy diet, regular physical exercise & weight monitoring are important for maintaining a healthy lifestyle    You may be retaining fluid if you have a history of heart failure or if you experience any of the following symptoms:  Weight gain of 3 pounds or more overnight or 5 pounds in a week, increased swelling in our hands or feet or shortness of breath while lying flat in bed. Please call your doctor as soon as you notice any of these symptoms; do not wait until your next office visit. The discharge information has been reviewed with the {PATIENT PARENT GUARDIAN:75593}. The {PATIENT PARENT GUARDIAN:60548} verbalized understanding. Discharge medications reviewed with the {Dishcarge meds reviewed THUX:39658} and appropriate educational materials and side effects teaching were provided. ___________________________________________________________________________________________________________________________________     Dilation and Curettage: What to Expect at Home  Your Recovery  Dilation and curettage (D&C) is a procedure to remove tissue from the inside of the uterus. The doctor used a curved tool, called a curette, to gently scrape tissue from your uterus. You are likely to have a backache, or cramps similar to menstrual cramps, and pass small clots of blood from your vagina for the first few days. You may continue to have light vaginal bleeding for several weeks after the procedure. You will probably be able to go back to most of your normal activities in 1 or 2 days. This care sheet gives you a general idea about how long it will take for you to recover. But each person recovers at a different pace. Follow the steps below to get better as quickly as possible. How can you care for yourself at home? Activity    · Rest when you feel tired. Getting enough sleep will help you recover.     · Avoid strenuous activities, such as bicycle riding, jogging, weight lifting, or aerobic exercise, until your doctor says it is okay.     · Most women are able to return to work the day after the procedure.     · You may have some light vaginal bleeding.  Wear sanitary pads if needed. Do not douche or use tampons for 2 weeks or until your doctor says it is okay.     · Ask your doctor when it is okay for you to have sex.     · If you could become pregnant, talk about birth control with your doctor. Do not try to become pregnant until your doctor says it is okay. Diet    · You can eat your normal diet. If your stomach is upset, try bland, low-fat foods like plain rice, broiled chicken, toast, and yogurt.     · Drink plenty of fluids (unless your doctor tells you not to). Medicines    · Your doctor will tell you if and when you can restart your medicines. He or she will also give you instructions about taking any new medicines.     · If you take blood thinners, such as warfarin (Coumadin), clopidogrel (Plavix), or aspirin, be sure to talk to your doctor. He or she will tell you if and when to start taking those medicines again. Make sure that you understand exactly what your doctor wants you to do.     · Be safe with medicines. Take pain medicines exactly as directed. ? If the doctor gave you a prescription medicine for pain, take it as prescribed. ? If you are not taking a prescription pain medicine, ask your doctor if you can take an over-the-counter medicine.     · If you think your pain medicine is making you sick to your stomach:  ? Take your medicine after meals (unless your doctor has told you not to). ? Ask your doctor for a different pain medicine.     · If your doctor prescribed antibiotics, take them as directed. Do not stop taking them just because you feel better. You need to take the full course of antibiotics. Follow-up care is a key part of your treatment and safety. Be sure to make and go to all appointments, and call your doctor if you are having problems. It's also a good idea to know your test results and keep a list of the medicines you take. When should you call for help? Call 911 anytime you think you may need emergency care.  For example, call if:    · You passed out (lost consciousness).     · You have chest pain, are short of breath, or cough up blood.    Call your doctor now or seek immediate medical care if:    · You have bright red vaginal bleeding that soaks one or more pads in an hour, or you have large clots.     · You have vaginal discharge that increases in amount or smells bad.     · You are sick to your stomach or cannot drink fluids.     · You have pain that does not get better after you take pain medicine.     · You cannot pass stools or gas.     · You have symptoms of a blood clot in your leg (called a deep vein thrombosis), such as:  ? Pain in your calf, back of the knee, thigh, or groin. ? Redness and swelling in your leg.     · You have signs of infection, such as:  ? Increased pain, swelling, warmth, or redness. ? Red streaks leading from the area. ? Pus draining from the area. ? A fever.    Watch closely for changes in your health, and be sure to contact your doctor if you have any problems. Where can you learn more? Go to http://lalita-james.info/. Enter [phone] in the search box to learn more about \"Dilation and Curettage: What to Expect at Home. \"  Current as of: May 29, 2019  Content Version: 12.2  © 6832-4117 "[employer]. Care instructions adapted under license by [employer] (which disclaims liability or warranty for this information). If you have questions about a medical condition or this instruction, always ask your healthcare professional. [place] [address] any warranty or liability for your use of this information.

## 2020-01-15 NOTE — INTERVAL H&P NOTE
H&P Update: 
Leigh Ann Washington was seen and examined. History and physical has been reviewed. The patient has been examined.  There have been no significant clinical changes since the completion of the originally dated History and Physical.

## 2020-01-15 NOTE — BRIEF OP NOTE
BRIEF OPERATIVE NOTE    Date of Procedure: 1/15/2020   Preoperative Diagnosis: R93.89 THICKENED ENDOMETRIUM  Postoperative Diagnosis: * No post-op diagnosis entered *    Procedure(s):  DILATATION AND CURETTAGE HYSTEROSCOPY  Surgeon(s) and Role:     * Kristopher Wallace MD - Primary         Surgical Assistant: none    Surgical Staff:  Circ-1: Irvin Zhang, NICOLE; Candee Hodgkin, NICOLE  Scrub Tech-1: Alma Delia Alcocer  Surg Asst-1: Cindy Treadwell  Event Time In Time Out   Incision Start 1524    Incision Close 1536      Anesthesia: General   Estimated Blood Loss: minimal  Specimens:   ID Type Source Tests Collected by Time Destination   1 : Endometral curetting Preservative   Kristopher Wallace MD 1/15/2020 1534 Pathology      Findings: small polyp on posterior wall of uterus   Complications: none  Implants: * No implants in log *

## 2020-01-16 NOTE — OP NOTES
[address] [place] [address]   OPERATIVE REPORT    Name:  See Diezr  MR#:   [id number]  :  1955  ACCOUNT #:  [de-identified]  DATE OF SERVICE:  01/15/2020    PREOPERATIVE DIAGNOSIS:  Persistent thickened endometrium. POSTOPERATIVE DIAGNOSIS:  Persistent thickened endometrium. PROCEDURE PERFORMED:  Hysteroscopy, D and C.    SURGEON:  Wilmer Yao DO    ASSISTANT:  None. ANESTHESIA:  LMA    COMPLICATIONS:  None. SPECIMENS REMOVED:  Endometrium curettings. IMPLANTS:  none. ESTIMATED BLOOD LOSS:  Minimal.    FLUIDS:  700    URINE OUTPUT:  Not recorded. FINDINGS:  Hysteroscopic findings revealed mostly atrophic-appearing endometrium with a small polyp in the posterior wall of the uterus. She also had some fluid within endocervical canal.    INDICATION:  The patient is a 23-year-old who initially was seen for a complex cyst and endometrial polyp. She has a history of myomectomy x3 and a right oophorectomy. She initially desired followup. Repeat ultrasound was done revealing resolution of the cyst but persistence of the endometrial thickness. She presents today for evaluation. PROCEDURE:  The patient was taken to the operating room where general anesthesia was obtained without difficulty. She was placed in dorsal lithotomy position, prepped and draped in normal sterile fashion. Surgical time-out was taken by the entire surgical team.  A sterile speculum was then placed in the patient's vagina. The anterior lip of the cervix was grasped with a single-tooth tenaculum. The cervix was then dilated. As the hysteroscope was inserted, there was a small perforation in the fundus of the uterus. The hysteroscope was withdrawn and there was no active bleeding. Hysteroscopy revealed a small endometrial polyp, otherwise, atrophic-appearing endometrium. A gentle curettage was performed and the small polyp was removed, sent for permanent specimen. The tenaculum was then removed.   The patient tolerated the procedure well. Sponge and needle counts correct x2 and the patient was taken to recovery room in stable condition.       Yann Patel, DO HOBSON/ARIANA_CGSNT_I/  D:  01/15/2020 15:49  T:  01/15/2020 23:40  JOB #:  [id number]

## 2020-01-16 NOTE — ANESTHESIA POSTPROCEDURE EVALUATION
Procedure(s):  DILATATION AND CURETTAGE HYSTEROSCOPY. general    Anesthesia Post Evaluation      Multimodal analgesia: multimodal analgesia used between 6 hours prior to anesthesia start to PACU discharge  Patient location during evaluation: PACU  Level of consciousness: awake and alert  Pain score: 3  Airway patency: patent  Anesthetic complications: no  Cardiovascular status: acceptable  Respiratory status: acceptable  Hydration status: acceptable  Post anesthesia nausea and vomiting:  none      Vitals Value Taken Time   /61 1/15/2020  4:36 PM   Temp 36.6 °C (97.9 °F) 1/15/2020  3:48 PM   Pulse 87 1/15/2020  4:44 PM   Resp 16 1/15/2020  4:44 PM   SpO2 91 % 1/15/2020  4:44 PM   Vitals shown include unvalidated device data.

## 2020-01-31 ENCOUNTER — OFFICE VISIT (OUTPATIENT)
Dept: ONCOLOGY | Age: 65
End: 2020-01-31

## 2020-01-31 VITALS
TEMPERATURE: 97.5 F | BODY MASS INDEX: 26.68 KG/M2 | HEART RATE: 96 BPM | HEIGHT: 66 IN | WEIGHT: 166 LBS | SYSTOLIC BLOOD PRESSURE: 129 MMHG | DIASTOLIC BLOOD PRESSURE: 73 MMHG | RESPIRATION RATE: 14 BRPM | OXYGEN SATURATION: 97 %

## 2020-01-31 DIAGNOSIS — D21.9 FIBROID: Primary | ICD-10-CM

## 2020-01-31 NOTE — LETTER
1/31/20 Patient: Genia Sandifer YOB: 1955 Date of Visit: 1/31/2020 241 Syed Lozano MD 
[address] [place] [address] K [address] [address] VIA Facsimile: [phone] Dear 241 Syed Lozano MD, Thank you for referring Ms. Monica Null to [facility] for evaluation. My notes for this consultation are attached. If you have questions, please do not hesitate to call me. I look forward to following your patient along with you. Sincerely, Aleisha Alvares MD

## 2020-01-31 NOTE — PROGRESS NOTES
[facility]  [address], [address], [address] [facility]  [address], [address] [place] [address] [place]   [phone]  Mahin Wayne DO      Postoperative Office Note  Patient ID:  Name: Lida Albarran  MRM: [id number]  : 1955/64 y.o. Date: 2020    SUBJECTIVE:    This is a 62 y.o.  female who presents s/p Hysteroscopy/D&C on 1/15/2020  Currently she has no problems with eating, bowel movements, voiding, or their wound  Appetite is good. Eating a regular diet without difficulty. Urinating without difficulty. Bowel movements are regular. The patient is not having any pain. .    Her pathology revealed      ENDOMETRIAL CURETTINGS:   SMALL LEIOMYOMA. Medications:     Current Outpatient Medications on File Prior to Visit   Medication Sig Dispense Refill    morphine IR (MS IR) 30 mg tablet Take 30 mg by mouth every four (4) hours as needed for Pain. Indications: pain      melatonin 5 mg tablet Take 10 mg by mouth nightly. Indications: takes melatonin gummies prn 10mg      pravastatin (PRAVACHOL) 40 mg tablet       tiZANidine (ZANAFLEX) 4 mg tablet       PARoxetine (PAXIL) 40 mg tablet paroxetine 40 mg tablet      dextroamphetamine sulfate (DEXTROAMPHETAMINE, BULK,) Take 20 mg by mouth two (2) times a day.  HYDROcodone-acetaminophen (VICODIN HP)  mg per tablet Take  by mouth.  valACYclovir (VALTREX) 500 mg tablet Take  by mouth two (2) times a day.  morphine (AVINZA) 120 mg SR capsule Take  by mouth daily.  temazepam (RESTORIL) 15 mg capsule Take  by mouth nightly as needed for Sleep.  escitalopram (LEXAPRO) 20 mg tablet Take 20 mg by mouth daily.  FIBER, HERBAL, PO Take  by mouth.  omeprazole (PRILOSEC) 20 mg capsule Take 20 mg by mouth daily. No current facility-administered medications on file prior to visit. Allergies:      Allergies   Allergen Reactions    Ibuprofen Unknown (comments)    Sulfa (Sulfonamide Antibiotics) Hives       OBJECTIVE:    Vitals: There were no vitals taken for this visit. Physical Examination:    General:  alert, cooperative, no distress   Abdomen: soft, bowel sounds active, non-tender   Incision: N/A   Pelvic: deferred   Rectal: not done   Extremity:   extremities normal, atraumatic, no cyanosis or edema     IMPRESSION/PLAN:    Violetta Whitney is Doing well postoperatively. .  She has a working diagnosis of benign leiomyoma. The operative procedures and clinical results have been reviewed with the patient. Implications of diagnosis discussed at length. All questions answered. I will see the patient back prn. The patient is advised to call our office with any problems or concerns.     Yoan Ruiz DO  Gynecologic Oncology  1/31/2020/10:57 AM

## 2020-01-31 NOTE — PATIENT INSTRUCTIONS
Hysteroscopy: What to Expect at [facility] Your Recovery A hysteroscopy is a procedure to find and treat problems with your uterus. It may have been done to remove growths from the uterus. Or it may have been done to diagnose or treat fertility problems. It can also be used to diagnose or treat abnormal bleeding. If the doctor filled your uterus with air, you may have gas pains or your belly may feel full. You may have cramps and light vaginal bleeding for a few days to several weeks. The cramps and bleeding may last longer if the hysteroscopy was used for treatment. You may also have shoulder pain right after the procedure. If the doctor filled your uterus with liquid, you may have watery vaginal discharge for several weeks. This care sheet gives you a general idea about how long it will take for you to recover. But each person recovers at a different pace. Follow the steps below to get better as quickly as possible. How can you care for yourself at home? Activity 
  · Rest when you feel tired.  
  · You can do your normal activities when it feels okay to do so.  
  · You may shower and take baths as usual.  
  · Ask your doctor when it is okay for you to have sex. Diet 
  · You can eat your normal diet. If your stomach is upset, try bland, low-fat foods like plain rice, broiled chicken, toast, and yogurt.  
  · If your bowel movements are not regular right after surgery, try to avoid constipation and straining. Drink plenty of water. Your doctor may suggest fiber, a stool softener, or a mild laxative. Medicines 
  · Your doctor will tell you if and when you can restart your medicines. He or she will also give you instructions about taking any new medicines.  
  · If you take aspirin or some other blood thinner, be sure to talk to your doctor. He or she will tell you if and when to start taking this medicine again. Make sure that you understand exactly what your doctor wants you to do.   · Be safe with medicines. Read and follow all instructions on the label. ? If the doctor gave you a prescription medicine for pain, take it as prescribed. ? If you are not taking a prescription pain medicine, ask your doctor if you can take an over-the-counter medicine.  
  · If your doctor prescribed antibiotics, take them as directed. Do not stop taking them just because you feel better. You need to take the full course of antibiotics. Other instructions 
  · You may have some light vaginal bleeding. Wear sanitary pads if needed. Do not use tampons until your doctor says it is okay.  
  · You may want to use a heating pad on your belly to help with pain. Use a low heat setting.  
  · Talk about birth control with your doctor. Do not try to become pregnant until your doctor says it is okay. Follow-up care is a key part of your treatment and safety. Be sure to make and go to all appointments, and call your doctor if you are having problems. It's also a good idea to know your test results and keep a list of the medicines you take. When should you call for help? Call 911 anytime you think you may need emergency care. For example, call if: 
  · You passed out (lost consciousness).  
  · You have chest pain, are short of breath, or cough up blood.  
 Call your doctor now or seek immediate medical care if: 
  · You have pain that does not get better after you take pain medicine.  
  · You cannot pass stools or gas.  
  · You have vaginal discharge that has increased in amount or smells bad.  
  · You are sick to your stomach or cannot drink fluids.  
  · You have signs of infection, such as: 
? Increased pain, swelling, warmth, or redness. ? A fever.  
  · You have bright red vaginal bleeding that soaks one or more pads in an hour, or you have large clots.  
  · You have signs of a blood clot in your leg (called a deep vein thrombosis), such as: 
? Pain in your calf, back of the knee, thigh, or groin. ? Redness and swelling in your leg.  
 Watch closely for changes in your health, and be sure to contact your doctor if you have any problems. Where can you learn more? Go to http://lalita-james.info/. Enter I360 in the search box to learn more about \"Hysteroscopy: What to Expect at Home. \" Current as of: February 19, 2019 Content Version: 12.2 © 1081-3169 [employer]. Care instructions adapted under license by [employer] (which disclaims liability or warranty for this information). If you have questions about a medical condition or this instruction, always ask your healthcare professional. [address] any warranty or liability for your use of this information.

## 2020-01-31 NOTE — PROGRESS NOTES
Regi Guzmán is a 62 y.o. female presents in office for surgical follow up, dilation and curretage. Chief Complaint   Patient presents with    Surgical Follow-up     2 week post op      Pt c/o three small hard lumps in her left hand from the IV used for surgery. Do you have any unusual vaginal bleeding, discharge or irritation? Pt reports bleeding stopped last Thursday. Do you have any changes in your bowel movements? Pt c/o constipation caused by medications  Have you been experiencing nausea or vomiting? No  Have you been experiencing any continuous or worsening abdominal pain? Pt c/o abdominal discomfort in area her cyst was. Any urinary burning? Pt c/o odor and cloudy urine, no burning with discharge. Visit Vitals  /73 (BP 1 Location: Left arm, BP Patient Position: Sitting)   Pulse 96   Temp 97.5 °F (36.4 °C) (Oral)   Resp 14   Ht 5' 6\" (1.676 m)   Wt 75.3 kg (166 lb)   SpO2 97%   BMI 26.79 kg/m²         Health Maintenance Due   Topic Date Due    Hepatitis C Screening  1955    Pneumococcal 0-64 years (1 of 1 - PPSV23) 10/22/1962    DTaP/Tdap/Td series (1 - Tdap) 10/22/1967    PAP AKA CERVICAL CYTOLOGY  10/22/1977    Shingrix Vaccine Age 50> (1 of 2) 10/22/2006    FOBT Q 1 YEAR AGE 50-75  10/22/2006    BREAST CANCER SCRN MAMMOGRAM  06/09/2017    Influenza Age 9 to Adult  08/01/2019         1. Have you been to the ER, urgent care clinic since your last visit? Hospitalized since your last visit? No    2. Have you seen or consulted any other health care providers outside of the [address] [place] [address] since your last visit? Include any pap smears or colon screening.  No    3 most recent PHQ Screens 1/31/2020   Little interest or pleasure in doing things Not at all   Feeling down, depressed, irritable, or hopeless More than half the days   Total Score PHQ 2 2       Learning Assessment 1/31/2020   PRIMARY LEARNER Patient   BARRIERS PRIMARY LEARNER NONE   CO-LEARNER CAREGIVER No PRIMARY LANGUAGE ENGLISH   LEARNER PREFERENCE PRIMARY DEMONSTRATION   ANSWERED BY Patient   RELATIONSHIP SELF

## 2023-01-11 ENCOUNTER — TRANSCRIBE ORDER (OUTPATIENT)
Dept: SCHEDULING | Age: 68
End: 2023-01-11

## 2023-01-11 DIAGNOSIS — Z12.31 BREAST CANCER SCREENING BY MAMMOGRAM: ICD-10-CM

## 2023-01-11 DIAGNOSIS — M81.0 AGE RELATED OSTEOPOROSIS: Primary | ICD-10-CM

## 2025-07-18 ENCOUNTER — TRANSCRIBE ORDERS (OUTPATIENT)
Facility: HOSPITAL | Age: 70
End: 2025-07-18

## 2025-07-18 DIAGNOSIS — Z12.31 BREAST CANCER SCREENING BY MAMMOGRAM: Primary | ICD-10-CM

## 2025-07-21 ENCOUNTER — TRANSCRIBE ORDERS (OUTPATIENT)
Facility: HOSPITAL | Age: 70
End: 2025-07-21

## 2025-07-21 DIAGNOSIS — M81.0 OSTEOPOROSIS, UNSPECIFIED OSTEOPOROSIS TYPE, UNSPECIFIED PATHOLOGICAL FRACTURE PRESENCE: Primary | ICD-10-CM

## (undated) DEVICE — PACK,LITHOTOMY: Brand: MEDLINE

## (undated) DEVICE — GARMENT,MEDLINE,DVT,SEQUENTIAL,CALF,MD: Brand: MEDLINE

## (undated) DEVICE — TRAY PREP DRY W/ PREM GLV 2 APPL 6 SPNG 2 UNDPD 1 OVERWRAP

## (undated) DEVICE — STERILE LATEX POWDER-FREE SURGICAL GLOVESWITH NITRILE COATING: Brand: PROTEXIS

## (undated) DEVICE — GOWN,REINFORCED,POLY,AURORA,XLARGE,STRL: Brand: MEDLINE

## (undated) DEVICE — DRAPE SURG L39XW46IN PERI OB

## (undated) DEVICE — PAD,SANITARY,11 IN,MAXI,N-STRL,IND WRAP: Brand: MEDLINE

## (undated) DEVICE — X-RAY SPONGES,12 PLY: Brand: DERMACEA

## (undated) DEVICE — MEDI-VAC NON-CONDUCTIVE SUCTION TUBING: Brand: CARDINAL HEALTH

## (undated) DEVICE — CATH URETH INTMIT ROB 16FR FUN -- CONVERT TO ITEM 179520

## (undated) DEVICE — KIT CLN UP BON SECOURS MARYV

## (undated) DEVICE — PAD,NON-ADHERENT,3X8,STERILE,LF,1/PK: Brand: MEDLINE